# Patient Record
Sex: FEMALE | HISPANIC OR LATINO | Employment: UNEMPLOYED | URBAN - METROPOLITAN AREA
[De-identification: names, ages, dates, MRNs, and addresses within clinical notes are randomized per-mention and may not be internally consistent; named-entity substitution may affect disease eponyms.]

---

## 2018-05-15 ENCOUNTER — PES CALL (OUTPATIENT)
Dept: ADMINISTRATIVE | Facility: CLINIC | Age: 83
End: 2018-05-15

## 2018-07-23 ENCOUNTER — OFFICE VISIT (OUTPATIENT)
Dept: INTERNAL MEDICINE | Facility: CLINIC | Age: 83
End: 2018-07-23
Payer: MEDICARE

## 2018-07-23 VITALS
WEIGHT: 128.19 LBS | HEIGHT: 60 IN | TEMPERATURE: 98 F | BODY MASS INDEX: 25.17 KG/M2 | DIASTOLIC BLOOD PRESSURE: 58 MMHG | RESPIRATION RATE: 12 BRPM | HEART RATE: 84 BPM | SYSTOLIC BLOOD PRESSURE: 152 MMHG

## 2018-07-23 DIAGNOSIS — H91.93 BILATERAL DEAFNESS: ICD-10-CM

## 2018-07-23 DIAGNOSIS — Z23 NEED FOR PROPHYLACTIC VACCINATION AGAINST STREPTOCOCCUS PNEUMONIAE (PNEUMOCOCCUS): ICD-10-CM

## 2018-07-23 DIAGNOSIS — E78.00 PURE HYPERCHOLESTEROLEMIA: ICD-10-CM

## 2018-07-23 DIAGNOSIS — D69.2 SENILE PURPURA: ICD-10-CM

## 2018-07-23 DIAGNOSIS — H35.30 MACULAR DEGENERATION OF BOTH EYES, UNSPECIFIED TYPE: ICD-10-CM

## 2018-07-23 DIAGNOSIS — I10 ESSENTIAL HYPERTENSION: Primary | ICD-10-CM

## 2018-07-23 DIAGNOSIS — E03.9 HYPOTHYROIDISM, UNSPECIFIED TYPE: ICD-10-CM

## 2018-07-23 DIAGNOSIS — M81.0 AGE-RELATED OSTEOPOROSIS WITHOUT CURRENT PATHOLOGICAL FRACTURE: ICD-10-CM

## 2018-07-23 PROCEDURE — G0009 ADMIN PNEUMOCOCCAL VACCINE: HCPCS | Mod: S$GLB,,, | Performed by: INTERNAL MEDICINE

## 2018-07-23 PROCEDURE — 99999 PR PBB SHADOW E&M-EST. PATIENT-LVL IV: CPT | Mod: PBBFAC,,, | Performed by: INTERNAL MEDICINE

## 2018-07-23 PROCEDURE — 99499 UNLISTED E&M SERVICE: CPT | Mod: S$GLB,,, | Performed by: INTERNAL MEDICINE

## 2018-07-23 PROCEDURE — 99204 OFFICE O/P NEW MOD 45 MIN: CPT | Mod: 25,S$GLB,, | Performed by: INTERNAL MEDICINE

## 2018-07-23 PROCEDURE — 90670 PCV13 VACCINE IM: CPT | Mod: S$GLB,,, | Performed by: INTERNAL MEDICINE

## 2018-07-23 NOTE — PROGRESS NOTES
Subjective:       Patient ID: Linda Bills is a 84 y.o. female.    Chief Complaint: Otalgia (symptoms to right ear x 2 days)    HPI  Pt establishing care.  Pt had r jaw and ear pain 2 days ago, now resolved.  Has a hx of hearing loss, hearing aids don't work well.  Also with macular degeneration.  No CP, SOB.  Takes meds for HTN, high chol, thyroid dz.  Stopped boniva for osteoporosis due to loosening teeth.  Has been told she has circulation problems.  Review of Systems   All other systems reviewed and are negative.      Objective:      Physical Exam   Constitutional: She appears well-developed. No distress.   HENT:   Head: Normocephalic.   TMs OK   Eyes: EOM are normal.   Neck: Normal range of motion. No tracheal deviation present.   Cardiovascular: Normal rate, regular rhythm, normal heart sounds and intact distal pulses.    Pulmonary/Chest: Effort normal and breath sounds normal. No respiratory distress.   Abdominal: Soft. Bowel sounds are normal. She exhibits no distension.   Musculoskeletal: Normal range of motion. She exhibits no edema.   Neurological: She is alert. No cranial nerve deficit. She exhibits normal muscle tone. Coordination normal.   Skin: Skin is warm and dry. No rash noted. She is not diaphoretic. No erythema.   Purpura UEs   Psychiatric: She has a normal mood and affect. Her behavior is normal.   Vitals reviewed.      Assessment:       1. Essential hypertension    2. Pure hypercholesterolemia    3. Age-related osteoporosis without current pathological fracture    4. Need for prophylactic vaccination against Streptococcus pneumoniae (pneumococcus)    5. Bilateral deafness    6. Macular degeneration of both eyes, unspecified type    7. Senile purpura    8. Hypothyroidism, unspecified type        Plan:       Linda was seen today for otalgia.    Diagnoses and all orders for this visit:    Essential hypertension   Monitor    Pure hypercholesterolemia  -     Lipid panel; Future  -      Comprehensive metabolic panel; Future    Age-related osteoporosis without current pathological fracture  -     DXA Bone Density Spine And Hip; Future    Need for prophylactic vaccination against Streptococcus pneumoniae (pneumococcus)  -     Pneumococcal Conjugate Vaccine (13 Valent) (IM)    Bilateral deafness  -     Ambulatory referral to ENT    Macular degeneration of both eyes, unspecified type   Per ophtho    Senile purpura   Monitor    Hypothyroidism, unspecified type  -     TSH; Future      Follow-up in about 2 weeks (around 8/6/2018).

## 2018-07-25 ENCOUNTER — TELEPHONE (OUTPATIENT)
Dept: OTOLARYNGOLOGY | Facility: CLINIC | Age: 83
End: 2018-07-25

## 2018-07-25 ENCOUNTER — OFFICE VISIT (OUTPATIENT)
Dept: OTOLARYNGOLOGY | Facility: CLINIC | Age: 83
End: 2018-07-25
Payer: MEDICARE

## 2018-07-25 ENCOUNTER — CLINICAL SUPPORT (OUTPATIENT)
Dept: OTOLARYNGOLOGY | Facility: CLINIC | Age: 83
End: 2018-07-25
Payer: MEDICARE

## 2018-07-25 VITALS
DIASTOLIC BLOOD PRESSURE: 70 MMHG | SYSTOLIC BLOOD PRESSURE: 136 MMHG | BODY MASS INDEX: 24.99 KG/M2 | TEMPERATURE: 98 F | WEIGHT: 128 LBS | HEART RATE: 68 BPM

## 2018-07-25 DIAGNOSIS — H69.93 ETD (EUSTACHIAN TUBE DYSFUNCTION), BILATERAL: ICD-10-CM

## 2018-07-25 DIAGNOSIS — H90.3 SENSORINEURAL HEARING LOSS (SNHL), BILATERAL: Primary | ICD-10-CM

## 2018-07-25 DIAGNOSIS — H90.3 SENSORINEURAL HEARING LOSS, BILATERAL: Primary | ICD-10-CM

## 2018-07-25 DIAGNOSIS — H93.13 TINNITUS AURIUM, BILATERAL: ICD-10-CM

## 2018-07-25 DIAGNOSIS — J30.89 NON-SEASONAL ALLERGIC RHINITIS, UNSPECIFIED TRIGGER: ICD-10-CM

## 2018-07-25 PROCEDURE — 99204 OFFICE O/P NEW MOD 45 MIN: CPT | Mod: S$GLB,,, | Performed by: OTOLARYNGOLOGY

## 2018-07-25 PROCEDURE — 99999 PR PBB SHADOW E&M-EST. PATIENT-LVL I: CPT | Mod: PBBFAC,,, | Performed by: AUDIOLOGIST-HEARING AID FITTER

## 2018-07-25 PROCEDURE — 92553 AUDIOMETRY AIR & BONE: CPT | Mod: S$GLB,,, | Performed by: AUDIOLOGIST-HEARING AID FITTER

## 2018-07-25 PROCEDURE — 92567 TYMPANOMETRY: CPT | Mod: S$GLB,,, | Performed by: AUDIOLOGIST-HEARING AID FITTER

## 2018-07-25 PROCEDURE — 99999 PR PBB SHADOW E&M-EST. PATIENT-LVL III: CPT | Mod: PBBFAC,,, | Performed by: OTOLARYNGOLOGY

## 2018-07-25 RX ORDER — LEVOCETIRIZINE DIHYDROCHLORIDE 5 MG/1
5 TABLET, FILM COATED ORAL NIGHTLY
Qty: 30 TABLET | Refills: 11 | Status: SHIPPED | OUTPATIENT
Start: 2018-07-25 | End: 2019-07-25

## 2018-07-25 NOTE — PROGRESS NOTES
Chief Complaint   Patient presents with    Tinnitus     hearing problems for a long time    Otalgia     pain in the right ear   .     HPI:  Linda Bills is a very pleasant 84 y.o. female here to see me today for the first time for evaluation of hearing loss.  She reports hearing loss that has been gradually progressing over the last several years.  She has not noted any difference in hearing between the ears, with either ear being the worse hearing ear. She notes that she has hearing aids that she purchased from [employer] 5 years ago.  She has noted any tinnitus in both ears.  She has had recent issues with ear pain or ear drainage.  She denies a family history of hearing loss, and has not had any previous otologic surgery.  She admits any history of significant loud noise exposure.She denies issues with dizziness. She reports right ear pain that started 3 nights ago and lasted for 20 minutes. She states that it has resolved now but is concerned about the pain.       Past Medical History:   Diagnosis Date    Hypertension      Social History     Social History    Marital status:      Spouse name: N/A    Number of children: N/A    Years of education: N/A     Occupational History    Not on file.     Social History Main Topics    Smoking status: Never Smoker    Smokeless tobacco: Never Used    Alcohol use No    Drug use: Unknown    Sexual activity: Not on file     Other Topics Concern    Not on file     Social History Narrative    No narrative on file     History reviewed. No pertinent surgical history.  History reviewed. No pertinent family history.      Review of Systems  General: negative for chills, fever or weight loss  Psychological: negative for mood changes or depression  Ophthalmic: negative for blurry vision, photophobia or eye pain  ENT: see HPI  Respiratory: no cough, shortness of breath, or wheezing  Cardiovascular: no chest pain or dyspnea on exertion  Gastrointestinal: no abdominal  pain, change in bowel habits, or black/ bloody stools  Musculoskeletal: negative for gait disturbance or muscular weakness  Neurological: no syncope or seizures; no ataxia  Dermatological: negative for puritis,  rash and jaundice  Hematologic/lymphatic: no easy bruising, no new lumps or bumps      Physical Exam:    Vitals:    07/25/18 1345   BP: 136/70   Pulse: 68   Temp: 98 °F (36.7 °C)       Constitutional: Well appearing / communicating without difficutly.  NAD.  Eyes: EOM I Bilaterally  Head/Face: Normocephalic.  Negative paranasal sinus pressure/tenderness.  Salivary glands WNL.  House Brackmann I Bilaterally.    Right Ear: Auricle normal appearance. External Auditory Canal within normal limits no lesions or masses,TM w/o masses/lesions/perforations. TM mobility noted.   Left Ear: Auricle normal appearance. External Auditory Canal within normal limits no lesions or masses,TM w/o masses/lesions/perforations. TM mobility noted.  Rinne Air conduction >bone conduction bilaterally, Sue midline.   Nose: No gross nasal septal deviation. Inferior Turbinates 3+ bilaterally. No septal perforation. No masses/lesions. External nasal skin appears normal without masses/lesions.  Oral Cavity: Gingiva/lips within normal limits.  Dentition/gingiva healthy appearing. Mucus membranes moist. Floor of mouth soft, no masses palpated. Oral Tongue mobile. Hard Palate appears normal.    Oropharynx: Base of tongue appears normal. No masses/lesions noted. Tonsillar fossa/pharyngeal wall without lesions. Posterior oropharynx WNL.  Soft palate without masses. Midline uvula.   Neck/Lymphatic: No LAD I-VI bilaterally.  No thyromegaly.  No masses noted on exam.    Mirror laryngoscopy/nasopharyngoscopy: Active gag reflex.  Unable to perform.    Neuro/Psychiatric: AOx3.  Normal mood and affect.   Cardiovascular: Normal carotid pulses bilaterally, no increasing jugular venous distention noted at cervical region bilaterally.    Respiratory:  Normal respiratory effort, no stridor, no retractions noted.      Audiogram reviewed personally by myself and in detail with the patient today.         Assessment:    ICD-10-CM ICD-9-CM    1. Sensorineural hearing loss (SNHL), bilateral H90.3 389.18    2. Tinnitus aurium, bilateral H93.13 388.31    3. ETD (Eustachian tube dysfunction), bilateral H69.83 381.81    4. Non-seasonal allergic rhinitis, unspecified trigger J30.89 477.8      The primary encounter diagnosis was Sensorineural hearing loss (SNHL), bilateral. Diagnoses of Tinnitus aurium, bilateral, ETD (Eustachian tube dysfunction), bilateral, and Non-seasonal allergic rhinitis, unspecified trigger were also pertinent to this visit.      Plan:  No orders of the defined types were placed in this encounter.     We reviewed the patient's recent audiogram and hearing loss in detail.  We also discussed that she is a good candidate for hearing aids, if and when she the patient is motivated.  She was given handouts with information and pricing of hearing aids, and will contact audiology when ready to proceed.  We also discussed the use hearing protection when exposed to loud noise, including lawn equipment.     We reviewed her audiogram together in detail.  We also discussed that tinnitus is most often caused by a hearing loss, and that as the hair cells are damaged, either genetic or as a result of loud noise exposure, they then cause tinnitus.  Some patients find that restricting the salt or caffeine in their diet helps.  Tinnitus tends to be louder in times of stress and fatigue, and may decrease with time.  Sound machines may also be an effective masking technique if needed at night.    We had a long discussion regarding the anatomy and function of the eustachian tube.  We discussed that the eustachian tube acts as a pump to keep the appropriate amount of pressure behind the ear drum.  I gave the patient a prescription for Xyzal to be used on a daily basis, and  we discussed gentle autoinsufflation exercises as well.     Thank you kindly for allowing me to participate in the patient's care.     This visit was 45 minutes in duration, with over 50% of the time spent in direct face-to-face counseling and coordination of care regarding the issues outlined above      Shy Perez MD

## 2018-07-25 NOTE — PROGRESS NOTES
Jacobo Lynne, CCC-A  [facility] [address]  [address]  [place] [address]      Patient: Linda Bills   MRN: [id number]  : 10/14/1933  MEYERS: 2018       AUDIOLOGICAL EVALUATION    CASE HISTORY:    Linda Bills, 84 y.o., was seen on the above date for an audiological evaluation. Patient reported hearing loss in both ears and currently has HARPAL hearing aids that she purchased from [employer]. No further history significant to hearing loss was reported.    TEST RESULTS:    Otoscopy was unremarkable for both ears. Imittance testing revealed normal middle ear compliance (Type A) in both ears. Pure tone testing indicated that Linda Bills has a mild sloping to severe sensorineural hearing loss in her right ear and a moderate sloping to severe sensorineural hearing loss in her left ear.     RECOMMENDATIONS:   It is recommended that she:  Continue to receive audiological monitoring annually.  Consider replacing her hearing aids.       If you should have any questions or concerns regarding the above information, please do not hesitate to contact me at [phone].      _______________________________  Franky Lynne, CCC-A  Clinical Audiologist    enclosure: audiogram

## 2018-07-25 NOTE — TELEPHONE ENCOUNTER
----- Message from Mary Vincent sent at 7/25/2018  6:37 AM CDT -----  Contact: Cal[place] request  Message     Appointment Request From: Linda Bills    With Provider: Other - (see comments)    Would Accept With:Request appointment time not available    Preferred Date Range: From 7/24/2018 To 8/10/2018    Preferred Times: Any    Reason for visit: Request an Appt    Comments:  Dr Shy Cedeño.  Need urgently appt  having problems with hearing

## 2018-07-25 NOTE — TELEPHONE ENCOUNTER
Spoke with patient scheduled sooner appointment for 7/27/18 at 2:00 with audiologist and to see Dr Cedeño to follow for hearing loss.

## 2018-08-02 ENCOUNTER — HOSPITAL ENCOUNTER (OUTPATIENT)
Dept: RADIOLOGY | Facility: HOSPITAL | Age: 83
Discharge: HOME | End: 2018-08-02
Attending: INTERNAL MEDICINE
Payer: MEDICARE

## 2018-08-02 DIAGNOSIS — M81.0 AGE-RELATED OSTEOPOROSIS WITHOUT CURRENT PATHOLOGICAL FRACTURE: ICD-10-CM

## 2018-08-02 PROCEDURE — 77080 DXA BONE DENSITY AXIAL: CPT | Mod: TC,PO

## 2018-08-09 ENCOUNTER — OFFICE VISIT (OUTPATIENT)
Dept: INTERNAL MEDICINE | Facility: CLINIC | Age: 83
End: 2018-08-09
Payer: MEDICARE

## 2018-08-09 VITALS
BODY MASS INDEX: 25.06 KG/M2 | RESPIRATION RATE: 20 BRPM | DIASTOLIC BLOOD PRESSURE: 60 MMHG | TEMPERATURE: 99 F | HEART RATE: 80 BPM | HEIGHT: 60 IN | SYSTOLIC BLOOD PRESSURE: 138 MMHG | WEIGHT: 127.63 LBS

## 2018-08-09 DIAGNOSIS — H90.3 SENSORINEURAL HEARING LOSS (SNHL) OF BOTH EARS: ICD-10-CM

## 2018-08-09 DIAGNOSIS — R27.0 ATAXIA: ICD-10-CM

## 2018-08-09 DIAGNOSIS — N18.30 CKD (CHRONIC KIDNEY DISEASE), STAGE III: ICD-10-CM

## 2018-08-09 DIAGNOSIS — E78.00 PURE HYPERCHOLESTEROLEMIA: ICD-10-CM

## 2018-08-09 DIAGNOSIS — M81.0 AGE-RELATED OSTEOPOROSIS WITHOUT CURRENT PATHOLOGICAL FRACTURE: Primary | ICD-10-CM

## 2018-08-09 DIAGNOSIS — I10 ESSENTIAL HYPERTENSION: ICD-10-CM

## 2018-08-09 PROBLEM — H91.93 BILATERAL DEAFNESS: Status: RESOLVED | Noted: 2018-07-23 | Resolved: 2018-08-09

## 2018-08-09 PROCEDURE — 99213 OFFICE O/P EST LOW 20 MIN: CPT | Mod: S$GLB,,, | Performed by: INTERNAL MEDICINE

## 2018-08-09 PROCEDURE — 99499 UNLISTED E&M SERVICE: CPT | Mod: S$GLB,,, | Performed by: INTERNAL MEDICINE

## 2018-08-09 PROCEDURE — 99999 PR PBB SHADOW E&M-EST. PATIENT-LVL IV: CPT | Mod: PBBFAC,,, | Performed by: INTERNAL MEDICINE

## 2018-08-09 RX ORDER — PENICILLIN V POTASSIUM 500 MG/1
TABLET, FILM COATED ORAL
COMMUNITY
Start: 2018-08-02

## 2018-08-09 RX ORDER — TRAMADOL HYDROCHLORIDE 50 MG/1
TABLET ORAL
COMMUNITY
Start: 2018-08-02

## 2018-08-09 RX ORDER — ALENDRONATE SODIUM 70 MG/1
70 TABLET ORAL
Qty: 12 TABLET | Refills: 3 | Status: SHIPPED | OUTPATIENT
Start: 2018-08-09 | End: 2019-08-09

## 2018-08-09 NOTE — PROGRESS NOTES
Subjective:       Patient ID: Linda Bills is a 84 y.o. female.    Chief Complaint: Results and Follow-up    HPI  Recheck.  Labs, DEXA reviewed.  Hearing better with hearing aids.  C/O poor balance, but no falls.  Review of Systems    Objective:      Physical Exam   Constitutional: She appears well-developed. No distress.   HENT:   Head: Normocephalic.   Eyes: EOM are normal.   Neck: Normal range of motion. No tracheal deviation present.   Cardiovascular: Normal rate, regular rhythm and intact distal pulses.    Pulmonary/Chest: Effort normal. No respiratory distress.   Abdominal: She exhibits no distension.   Musculoskeletal: Normal range of motion. She exhibits no edema.   Neurological: She is alert. No cranial nerve deficit. She exhibits normal muscle tone. Coordination normal.   Skin: Skin is warm and dry. No rash noted. She is not diaphoretic. No erythema.   Psychiatric: She has a normal mood and affect. Her behavior is normal.   Vitals reviewed.      Assessment:       1. Age-related osteoporosis without current pathological fracture    2. Sensorineural hearing loss (SNHL) of both ears    3. Ataxia    4. Essential hypertension    5. Pure hypercholesterolemia    6. CKD (chronic kidney disease), stage III        Plan:       Linda was seen today for results and follow-up.    Diagnoses and all orders for this visit:    Age-related osteoporosis without current pathological fracture  -     alendronate (FOSAMAX) 70 MG tablet; Take 1 tablet (70 mg total) by mouth every 7 days.    Sensorineural hearing loss (SNHL) of both ears   Cont hearing aids    Ataxia  -     CANE FOR HOME     Essential hypertension   Well-cont    Pure hypercholesterolemia   Well-cont      Follow-up in about 1 year (around 8/9/2019).

## 2018-09-18 ENCOUNTER — PES CALL (OUTPATIENT)
Dept: ADMINISTRATIVE | Facility: CLINIC | Age: 83
End: 2018-09-18

## 2018-10-22 ENCOUNTER — PES CALL (OUTPATIENT)
Dept: ADMINISTRATIVE | Facility: CLINIC | Age: 83
End: 2018-10-22

## 2020-09-29 ENCOUNTER — PATIENT MESSAGE (OUTPATIENT)
Dept: OTHER | Facility: OTHER | Age: 85
End: 2020-09-29

## 2020-10-05 ENCOUNTER — PATIENT MESSAGE (OUTPATIENT)
Dept: INTERNAL MEDICINE | Facility: CLINIC | Age: 85
End: 2020-10-05

## 2020-12-11 ENCOUNTER — PATIENT MESSAGE (OUTPATIENT)
Dept: OTHER | Facility: OTHER | Age: 85
End: 2020-12-11

## 2021-01-04 ENCOUNTER — PATIENT MESSAGE (OUTPATIENT)
Dept: ADMINISTRATIVE | Facility: HOSPITAL | Age: 86
End: 2021-01-04

## 2021-04-05 ENCOUNTER — PATIENT MESSAGE (OUTPATIENT)
Dept: ADMINISTRATIVE | Facility: HOSPITAL | Age: 86
End: 2021-04-05

## 2021-04-28 ENCOUNTER — PES CALL (OUTPATIENT)
Dept: ADMINISTRATIVE | Facility: CLINIC | Age: 86
End: 2021-04-28

## 2023-05-31 ENCOUNTER — PES CALL (OUTPATIENT)
Dept: ADMINISTRATIVE | Facility: CLINIC | Age: 88
End: 2023-05-31
Payer: MEDICARE